# Patient Record
Sex: MALE | Race: WHITE | NOT HISPANIC OR LATINO | URBAN - METROPOLITAN AREA
[De-identification: names, ages, dates, MRNs, and addresses within clinical notes are randomized per-mention and may not be internally consistent; named-entity substitution may affect disease eponyms.]

---

## 2020-10-20 ENCOUNTER — INPATIENT (INPATIENT)
Facility: HOSPITAL | Age: 25
LOS: 6 days | Discharge: HOME | End: 2020-10-27
Attending: STUDENT IN AN ORGANIZED HEALTH CARE EDUCATION/TRAINING PROGRAM | Admitting: PSYCHIATRY & NEUROLOGY
Payer: COMMERCIAL

## 2020-10-20 VITALS
HEART RATE: 63 BPM | RESPIRATION RATE: 18 BRPM | SYSTOLIC BLOOD PRESSURE: 122 MMHG | DIASTOLIC BLOOD PRESSURE: 58 MMHG | TEMPERATURE: 98 F

## 2020-10-20 DIAGNOSIS — F32.9 MAJOR DEPRESSIVE DISORDER, SINGLE EPISODE, UNSPECIFIED: ICD-10-CM

## 2020-10-20 DIAGNOSIS — F48.9 NONPSYCHOTIC MENTAL DISORDER, UNSPECIFIED: ICD-10-CM

## 2020-10-20 LAB
ALBUMIN SERPL ELPH-MCNC: 4.9 G/DL — SIGNIFICANT CHANGE UP (ref 3.3–5)
ALP SERPL-CCNC: 55 U/L — SIGNIFICANT CHANGE UP (ref 40–120)
ALT FLD-CCNC: 11 U/L — SIGNIFICANT CHANGE UP (ref 4–41)
AMPHET UR-MCNC: NEGATIVE — SIGNIFICANT CHANGE UP
ANION GAP SERPL CALC-SCNC: 11 MMO/L — SIGNIFICANT CHANGE UP (ref 7–14)
APAP SERPL-MCNC: < 15 UG/ML — LOW (ref 15–25)
APPEARANCE UR: CLEAR — SIGNIFICANT CHANGE UP
AST SERPL-CCNC: 13 U/L — SIGNIFICANT CHANGE UP (ref 4–40)
B PERT DNA SPEC QL NAA+PROBE: SIGNIFICANT CHANGE UP
BACTERIA # UR AUTO: NEGATIVE — SIGNIFICANT CHANGE UP
BARBITURATES UR SCN-MCNC: NEGATIVE — SIGNIFICANT CHANGE UP
BASOPHILS # BLD AUTO: 0.06 K/UL — SIGNIFICANT CHANGE UP (ref 0–0.2)
BASOPHILS NFR BLD AUTO: 0.8 % — SIGNIFICANT CHANGE UP (ref 0–2)
BENZODIAZ UR-MCNC: NEGATIVE — SIGNIFICANT CHANGE UP
BILIRUB SERPL-MCNC: 0.3 MG/DL — SIGNIFICANT CHANGE UP (ref 0.2–1.2)
BILIRUB UR-MCNC: NEGATIVE — SIGNIFICANT CHANGE UP
BLOOD UR QL VISUAL: NEGATIVE — SIGNIFICANT CHANGE UP
BUN SERPL-MCNC: 26 MG/DL — HIGH (ref 7–23)
C PNEUM DNA SPEC QL NAA+PROBE: SIGNIFICANT CHANGE UP
CALCIUM SERPL-MCNC: 9.6 MG/DL — SIGNIFICANT CHANGE UP (ref 8.4–10.5)
CANNABINOIDS UR-MCNC: NEGATIVE — SIGNIFICANT CHANGE UP
CHLORIDE SERPL-SCNC: 103 MMOL/L — SIGNIFICANT CHANGE UP (ref 98–107)
CO2 SERPL-SCNC: 26 MMOL/L — SIGNIFICANT CHANGE UP (ref 22–31)
COCAINE METAB.OTHER UR-MCNC: NEGATIVE — SIGNIFICANT CHANGE UP
COLOR SPEC: YELLOW — SIGNIFICANT CHANGE UP
CREAT SERPL-MCNC: 1.17 MG/DL — SIGNIFICANT CHANGE UP (ref 0.5–1.3)
EOSINOPHIL # BLD AUTO: 0.04 K/UL — SIGNIFICANT CHANGE UP (ref 0–0.5)
EOSINOPHIL NFR BLD AUTO: 0.5 % — SIGNIFICANT CHANGE UP (ref 0–6)
ETHANOL BLD-MCNC: < 10 MG/DL — SIGNIFICANT CHANGE UP
FLUAV H1 2009 PAND RNA SPEC QL NAA+PROBE: SIGNIFICANT CHANGE UP
FLUAV H1 RNA SPEC QL NAA+PROBE: SIGNIFICANT CHANGE UP
FLUAV H3 RNA SPEC QL NAA+PROBE: SIGNIFICANT CHANGE UP
FLUAV SUBTYP SPEC NAA+PROBE: SIGNIFICANT CHANGE UP
FLUBV RNA SPEC QL NAA+PROBE: SIGNIFICANT CHANGE UP
GLUCOSE SERPL-MCNC: 90 MG/DL — SIGNIFICANT CHANGE UP (ref 70–99)
GLUCOSE UR-MCNC: NEGATIVE — SIGNIFICANT CHANGE UP
HADV DNA SPEC QL NAA+PROBE: SIGNIFICANT CHANGE UP
HCOV PNL SPEC NAA+PROBE: SIGNIFICANT CHANGE UP
HCT VFR BLD CALC: 46 % — SIGNIFICANT CHANGE UP (ref 39–50)
HGB BLD-MCNC: 15.2 G/DL — SIGNIFICANT CHANGE UP (ref 13–17)
HMPV RNA SPEC QL NAA+PROBE: SIGNIFICANT CHANGE UP
HPIV1 RNA SPEC QL NAA+PROBE: SIGNIFICANT CHANGE UP
HPIV2 RNA SPEC QL NAA+PROBE: SIGNIFICANT CHANGE UP
HPIV3 RNA SPEC QL NAA+PROBE: SIGNIFICANT CHANGE UP
HPIV4 RNA SPEC QL NAA+PROBE: SIGNIFICANT CHANGE UP
HYALINE CASTS # UR AUTO: NEGATIVE — SIGNIFICANT CHANGE UP
IMM GRANULOCYTES NFR BLD AUTO: 0.3 % — SIGNIFICANT CHANGE UP (ref 0–1.5)
KETONES UR-MCNC: NEGATIVE — SIGNIFICANT CHANGE UP
LEUKOCYTE ESTERASE UR-ACNC: NEGATIVE — SIGNIFICANT CHANGE UP
LYMPHOCYTES # BLD AUTO: 2.28 K/UL — SIGNIFICANT CHANGE UP (ref 1–3.3)
LYMPHOCYTES # BLD AUTO: 30.8 % — SIGNIFICANT CHANGE UP (ref 13–44)
MCHC RBC-ENTMCNC: 28 PG — SIGNIFICANT CHANGE UP (ref 27–34)
MCHC RBC-ENTMCNC: 33 % — SIGNIFICANT CHANGE UP (ref 32–36)
MCV RBC AUTO: 84.9 FL — SIGNIFICANT CHANGE UP (ref 80–100)
METHADONE UR-MCNC: NEGATIVE — SIGNIFICANT CHANGE UP
MONOCYTES # BLD AUTO: 0.42 K/UL — SIGNIFICANT CHANGE UP (ref 0–0.9)
MONOCYTES NFR BLD AUTO: 5.7 % — SIGNIFICANT CHANGE UP (ref 2–14)
NEUTROPHILS # BLD AUTO: 4.58 K/UL — SIGNIFICANT CHANGE UP (ref 1.8–7.4)
NEUTROPHILS NFR BLD AUTO: 61.9 % — SIGNIFICANT CHANGE UP (ref 43–77)
NITRITE UR-MCNC: NEGATIVE — SIGNIFICANT CHANGE UP
NRBC # FLD: 0 K/UL — SIGNIFICANT CHANGE UP (ref 0–0)
OPIATES UR-MCNC: NEGATIVE — SIGNIFICANT CHANGE UP
OXYCODONE UR-MCNC: NEGATIVE — SIGNIFICANT CHANGE UP
PCP UR-MCNC: NEGATIVE — SIGNIFICANT CHANGE UP
PH UR: 6 — SIGNIFICANT CHANGE UP (ref 5–8)
PLATELET # BLD AUTO: 199 K/UL — SIGNIFICANT CHANGE UP (ref 150–400)
PMV BLD: 11.5 FL — SIGNIFICANT CHANGE UP (ref 7–13)
POTASSIUM SERPL-MCNC: 4.4 MMOL/L — SIGNIFICANT CHANGE UP (ref 3.5–5.3)
POTASSIUM SERPL-SCNC: 4.4 MMOL/L — SIGNIFICANT CHANGE UP (ref 3.5–5.3)
PROT SERPL-MCNC: 7.5 G/DL — SIGNIFICANT CHANGE UP (ref 6–8.3)
PROT UR-MCNC: 50 — SIGNIFICANT CHANGE UP
RAPID RVP RESULT: SIGNIFICANT CHANGE UP
RBC # BLD: 5.42 M/UL — SIGNIFICANT CHANGE UP (ref 4.2–5.8)
RBC # FLD: 12.1 % — SIGNIFICANT CHANGE UP (ref 10.3–14.5)
RBC CASTS # UR COMP ASSIST: SIGNIFICANT CHANGE UP (ref 0–?)
RSV RNA SPEC QL NAA+PROBE: SIGNIFICANT CHANGE UP
RV+EV RNA SPEC QL NAA+PROBE: SIGNIFICANT CHANGE UP
SALICYLATES SERPL-MCNC: < 5 MG/DL — LOW (ref 15–30)
SARS-COV-2 RNA SPEC QL NAA+PROBE: SIGNIFICANT CHANGE UP
SODIUM SERPL-SCNC: 140 MMOL/L — SIGNIFICANT CHANGE UP (ref 135–145)
SP GR SPEC: 1.04 — SIGNIFICANT CHANGE UP (ref 1–1.04)
SQUAMOUS # UR AUTO: SIGNIFICANT CHANGE UP
TSH SERPL-MCNC: 1.29 UIU/ML — SIGNIFICANT CHANGE UP (ref 0.27–4.2)
UROBILINOGEN FLD QL: NORMAL — SIGNIFICANT CHANGE UP
WBC # BLD: 7.4 K/UL — SIGNIFICANT CHANGE UP (ref 3.8–10.5)
WBC # FLD AUTO: 7.4 K/UL — SIGNIFICANT CHANGE UP (ref 3.8–10.5)
WBC UR QL: SIGNIFICANT CHANGE UP (ref 0–?)

## 2020-10-20 PROCEDURE — 99285 EMERGENCY DEPT VISIT HI MDM: CPT

## 2020-10-20 NOTE — ED PROVIDER NOTE - CLINICAL SUMMARY MEDICAL DECISION MAKING FREE TEXT BOX
This is a 25 yr old M, with no psychiatric nor medical hx with c/o bizarre behaviour. As per triage RN, pt bib father ( [phone]) who told RN, today he took soon to a Crisis Center due he stopped talking approximately 2 wks ago.   Upon arrival to  pt calm and cooperative responding with head nodding, but did not choose to speak. He appears sad and depressed.  Labs, psych consult

## 2020-10-20 NOTE — ED BEHAVIORAL HEALTH ASSESSMENT NOTE - SUMMARY
Patient is a 25 year old single unemployed non-caregiver  male currently residing in a private residence with family. Recently unenrolled from [facility] Fall 2020. No PPH. No history of suicide attempts. No hx of aggression, violence, legal issues or substance abuse problems. No PMH. BIB father referred by therapist for depression.    Patient presents to the ED referred for depression. Patient is minimally cooperative with interview. He admits to depressive symptoms related to feelings of worthlessness. He endorses hopelessness, it not future motivated and passive suicidal ideation. Patient is unable to safety plan. He cannot name protective factors or reasons for living. He is unwilling to participate in outpatient treatment stating nothing will help him. Per father he has not been eating, isolative and not functioning well. He refuses to participate in safety plan endorsing passive suicidal ideation, hopelessness and depression. He has poor judgement/insight and presents at risk to self. He requires inpatient admission for safety and stabilization. Patient is a 25 year old single unemployed non-caregiver  male currently residing in a private residence with family. Recently unenrolled from [facility] Fall 2020. No PPH. No history of suicide attempts. No hx of aggression, violence, legal issues or substance abuse problems. No PMH. BIB father referred by therapist for depression.    Patient presents to the ED referred for depression. Patient admits to depressive symptoms related to feelings of worthlessness. He endorses hopelessness, it not future motivated and endorses passive suicidal ideation. Patient is unable to safety plan. He cannot name protective factors or reasons for living. He is unwilling to participate in outpatient treatment stating nothing will help him. Per father he has not been eating, isolative and not functioning well. He refuses to participate in safety plan endorsing passive suicidal ideation, hopelessness and depression. He has poor judgement/insight and presents at risk to self. He requires inpatient admission for safety and stabilization.

## 2020-10-20 NOTE — ED ADULT NURSE NOTE - OBJECTIVE STATEMENT
Pt brought in by family, a&ox3, calm and cooperative, selective mute, not responding to questions other then head nod, denies si/ah, substance abuse. Pt tearful, does not appear in distress. Respirations even/unlabored, nad noted. belongings collected and secured. psych to see. will continue to monitor

## 2020-10-20 NOTE — ED BEHAVIORAL HEALTH ASSESSMENT NOTE - HPI (INCLUDE ILLNESS QUALITY, SEVERITY, DURATION, TIMING, CONTEXT, MODIFYING FACTORS, ASSOCIATED SIGNS AND SYMPTOMS)
Patient is a 25 year old single unemployed non-caregiver  male currently residing in a private residence with family. Recently unenrolled from [facility] Fall 2020. No PPH. No history of suicide attempts. No hx of aggression, violence, legal issues or substance abuse problems. No PMH. BIB father referred by therapist for depression.    Patient reports he does not know why he is here. He was noted to have poor eye contact and appear dysphoric. He was tearful at times. He stated he doesn't know why he is here. He admits to not speaking to his family but when asked why he stated "I don't know." He nodded when asked if he was feeling sad. He stated he has been feeling this way x a few months. When asked about precipitants or triggers he said "everything." He refused to elaborate. He stated "it's too much, it's everything." Patient later admitted that one of these things is that no one wants to be around him. He was asked to elaborate or why he thought this and he stated "because I do." He admitted to passive SI stating he has been having thoughts he wishes he was dead/not alive and has been feeling this way x 1 week. He stated he is a failure and "there is no point." He was noted to have facial hair and asked if this is his normal appearance and he stated" there is no point anymore." Patient was questioned regarding active SI/plan/intent and he denied. He was asked what makes him want to keep living and he said "nothing." Patient was asked about interest in treatment and he stated nothing would help him. He stated "I can't be fixed." Patient is a 25 year old single unemployed non-caregiver  male currently residing in a private residence with family. Recently unenrolled from [facility] Fall 2020. No PPH. No history of suicide attempts. No hx of aggression, violence, legal issues or substance abuse problems. No PMH. BIB father referred by therapist for depression.    Patient reports he does not know why he is here. He was noted to have poor eye contact and appear dysphoric. He was tearful at times. He stated he doesn't know why he is here. He admits to not speaking to his family but when asked why he stated "I don't know." He nodded when asked if he was feeling sad. He stated he has been feeling this way x a few months. When asked about precipitants or triggers he said "everything." He refused to elaborate. He stated "it's too much, it's everything." Patient later admitted that one of these things is that no one wants to be around him. He was asked to elaborate or why he thought this and he stated "because I do." He admitted to passive SI stating he has been having thoughts he wishes he was dead/not alive and has been feeling this way x 1 week. He stated he is a failure and "there is no point." He was noted to have facial hair and asked if this is his normal appearance and he stated" there is no point anymore." Patient was questioned regarding active SI/plan/intent and he denied. He was asked what makes him want to keep living and he said "nothing." Patient was asked about interest in treatment and he stated nothing would help him. He stated "I can't be fixed." He was questioned about what he does during the day and he said "nothing." He reports he has nothing to enjoy.     Patient denies any hallucinations, does not report any delusional thought content, denies thought insertion/withdrawal, denies referential thought processes & is not paranoid on interview. Pt is linear,logical, organized and well related. Patient does not report nor exhibit any signs of ted, including irritable or elevated mood, grandiosity, pressured speech, risk-taking behaviors, increase in productivity or agitation. Patient denies HI, violent thoughts.     See  note for collateral information. Patient is a 25 year old single unemployed non-caregiver  male currently residing in a private residence with family. Recently unenrolled from [facility] Fall 2020. No PPH. No history of suicide attempts. No hx of aggression, violence, legal issues or substance abuse problems. No PMH. BIB father referred by therapist for depression.    Patient reports he does not know why he is here. He was noted to have poor eye contact and appear dysphoric. He was tearful at times. He stated he doesn't know why he is here. He admits to not speaking to his family but when asked why he stated "I don't know." He nodded when asked if he was feeling sad. He stated he has been feeling this way x a few months. When asked about precipitants or triggers he said "everything." He refused to elaborate. He stated "it's too much, it's everything." Patient later admitted that one of these things is that no one wants to be around him and he is "socially inept."  He was asked to elaborate or why he thought this and he stated "because I do." He stated at school and at home no one wants to hang out with him because of the "way I am." He reports his depression is a symptom of his social issues which cannot be fixed. He admitted to passive SI stating he has been having thoughts he wishes he was dead/not alive and has been feeling this way x 1 week. He stated he is a failure and "there is no point." He was noted to have facial hair and asked if this is his normal appearance and he stated" there is no point anymore." Patient was questioned regarding active SI/plan/intent and he denied. He was asked what makes him want to keep living and he said "nothing." Patient was asked about interest in treatment and he stated nothing would help him. He stated "I can't be fixed." He was questioned about what he does during the day and he said "nothing." He reports he has nothing to enjoy.     Patient denies any hallucinations, does not report any delusional thought content, denies thought insertion/withdrawal, denies referential thought processes & is not paranoid on interview. Pt is linear,logical, organized and well related. Patient does not report nor exhibit any signs of ted, including irritable or elevated mood, grandiosity, pressured speech, risk-taking behaviors, increase in productivity or agitation. Patient denies HI, violent thoughts.     See  note for collateral information. Patient is a 25 year old single unemployed non-caregiver  male currently residing in a private residence with family. Recently unenrolled from [facility] Fall 2020. No PPH. No history of suicide attempts. No hx of aggression, violence, legal issues or substance abuse problems. No PMH. BIB father referred by therapist for depression.    Patient reports he does not know why he is here. He was noted to have poor eye contact and appear dysphoric. He was tearful at times. He stated he doesn't know why he is here. He admits to not speaking to his family but when asked why he stated "I don't know." He nodded when asked if he was feeling sad. He stated he has been feeling this way x a few months. When asked about precipitants or triggers he said "everything." He refused to elaborate. He stated "it's too much, it's everything." Patient later admitted that one of these things is that no one wants to be around him and he is "socially inept."  He was asked to elaborate or why he thought this and he stated "because I do." He stated at school and at home no one wants to hang out with him because of the "way I am." He reports his depression is a symptom of his social issues which cannot be fixed. He admitted to passive SI stating he has been having thoughts he wishes he was dead/not alive and has been feeling this way x 1 week. He stated he is a failure and "there is no point." He was noted to have facial hair and asked if this is his normal appearance and he stated" there is no point anymore." Patient was questioned regarding active SI/plan/intent and he denied. He was asked what makes him want to keep living and he said "nothing." He refused to safety plan stating he has no one he could reach out to. Patient was asked about interest in treatment and he stated nothing would help him. He stated "I can't be fixed." He was questioned about what he does during the day and he said "nothing." He reports he has nothing to enjoy.     Patient denies any hallucinations, does not report any delusional thought content, denies thought insertion/withdrawal, denies referential thought processes & is not paranoid on interview. Pt is linear,logical, organized and well related. Patient does not report nor exhibit any signs of ted, including irritable or elevated mood, grandiosity, pressured speech, risk-taking behaviors, increase in productivity or agitation. Patient denies HI, violent thoughts.     See  note for collateral information.

## 2020-10-20 NOTE — ED BEHAVIORAL HEALTH ASSESSMENT NOTE - DETAILS
see hpi paternal grandfather father paternal grandfather committed suicide Dr. Griffith [address]

## 2020-10-20 NOTE — ED BEHAVIORAL HEALTH ASSESSMENT NOTE - RISK ASSESSMENT
risk factors- depression, suicidality, impairment in functioning, unable to safety plan, hopelessness, family history of suicide and not future oriented Moderate Acute Suicide Risk risk factors- depression, suicidality, impairment in functioning, unable to safety plan, hopelessness, family history of suicide, not eating and not future oriented

## 2020-10-20 NOTE — ED BEHAVIORAL HEALTH ASSESSMENT NOTE - DESCRIPTION
During course of ED visit patient was calm and cooperative. Patient was not aggressive or violent and did not require PRN medications.    Vital Signs Last 24 Hrs  T(C): 36.4 (20 Oct 2020 16:17), Max: 36.4 (20 Oct 2020 16:17)  T(F): 97.5 (20 Oct 2020 16:17), Max: 97.5 (20 Oct 2020 16:17)  HR: 63 (20 Oct 2020 16:17) (63 - 63)  BP: 122/58 (20 Oct 2020 16:17) (122/58 - 122/58)  BP(mean): --  RR: 18 (20 Oct 2020 16:17) (18 - 18)  SpO2: -- none see hpi

## 2020-10-20 NOTE — ED BEHAVIORAL HEALTH NOTE - BEHAVIORAL HEALTH NOTE
Writer outreached pt's father, Torito Carbajal, at [phone] to provide update. Pt's mother, Shreya, answered and was informed of pt's boarding status for hopefully bed assignment at [facility] in AM. Support offered with questions addressed.

## 2020-10-20 NOTE — ED PROVIDER NOTE - OBJECTIVE STATEMENT
This is a 25 yr old M, with no psychiatric nor medical hx with c/o bizarre behaviour. As per triage RN, pt bib father ( [phone]) who told RN, today he took soon to a Crisis Center due he stopped talking approximately 2 wks ago.   Upon arrival to  pt calm and cooperative responding with head nodding, but did not choose to speak. He appears sad and depressed.

## 2020-10-20 NOTE — ED ADULT TRIAGE NOTE - CHIEF COMPLAINT QUOTE
Pt with depression not speaking x 2 weeks to any adult per father . Pt was sent over from crisis  center.

## 2020-10-20 NOTE — ED BEHAVIORAL HEALTH NOTE - BEHAVIORAL HEALTH NOTE
COVID Exposure Screen- Patient    1.	*In the past 14 days, have you been around anyone with a positive COVID-19 test?*   (  ) Yes   ( x ) No   (  ) Unknown- Reason (e.g. patient uncertain, sedated, refusing to answer, etc.):  ______  IF YES PROCEED TO QUESTION #2. IF NO or UNKNOWN, PLEASE SKIP TO QUESTION #7  2.	Were you within 6 feet of them for at least 15 minutes? (  ) Yes   (  ) No   (  ) Unknown- Reason: ______    3.	Have you provided care for them? (  ) Yes   (  ) No   (  ) Unknown- Reason: ______    4.	Have you had direct physical contact with them (touched, hugged, or kissed them)? (  ) Yes   (  ) No    (  ) Unknown- Reason: ______    5.	Have you shared eating or drinking utensils with them? (  ) Yes   (  ) No    (  ) Unknown- Reason: ______    6.	Have they sneezed, coughed, or somehow got respiratory droplets on you? (  ) Yes   (  ) No    (  ) Unknown- Reason: ______    7.	*Have you been out of [place] within the past 14 days?*  (  ) Yes   ( x ) No   (  ) Unknown- Reason (e.g. patient uncertain, sedated, refusing to answer, etc.): _______  IF YES PLEASE ANSWER THE FOLLOWING QUESTIONS:  8.	Which state/country have you been to? ______   9.	Were you there over 24 hours? (  ) Yes   (  ) No    (  ) Unknown- Reason: ______    10.	What date did you return to [facility]? ______

## 2020-10-20 NOTE — ED BEHAVIORAL HEALTH NOTE - BEHAVIORAL HEALTH NOTE
Pt is a 25 year old male BIB father referred after therapy appointment today. Writer contacted pt’s father, Torito Solomon Vallejo, at [phone]. Pt’s father provided the following information:     Pt domiciled with parents and 3 siblings. Pt with no medical concerns. No formal mental health history. No past psychiatric hospitalizations. Father although reports one previous episode of depression at 20 years old while attending college at [employer]. Father reports pt withdrew, attended brief talk therapy 3-4 months, no medications, and did better after time. Pt then returned to college attending [employer] in 2019. Pt returned to school this past Aug. 2020 for senior year. Pt living off campus with 2 roommates. Father reports state in lockdown after school started. Pt did not adjust well to lockdown started to become depressed. Father visited pt third week in August not at his best and was going to give it one more week. Pt then first week in September withdrew from college and returned home. Pt has remained home since unemployed, not in school, and playing video games majority of his time. Father reports that pt since has withdrawn from family. Pt remaining in room and in last 2 weeks stopped communicating with family. Father explains a “sleep mode” to which pt is sleeping just won’t communicate despite attempts to engage. Father can however hear pt on video games at night talking, but pt will not speak to family. Pt earlier in August 2020 at start of decline was reported to have expressed negative thoughts about self in regards to having no one to spend time with and not having a girlfriend. Father reports no SI intent or plan ever expressed. Sister asked to which pt reported “absolutely not” and wouldn’t be able to harm self. Pt has no hx of attempts. No HI intent or plan reported. Pt has no hx of psychosis. No hx of violence reported. No hx of trauma. No known exposure to COVID in the last month. No concerns for symptoms. Father reports some weight loss with family now cooking meals/encouraging pt to eat more. Pt is showering. No concerns for sleep changes. No reported substance abuse. Pt at baseline said to be social, in shape, eating, and everything is fine.    Father took pt to therapist today. Concerns brought up were pt not communicating, appearing lethargic, not eating, in which pt was directed to ED for further evaluation Family hx of depression reported. Paternal grandfather passed from suicide. Father does not report any known acute safety concerns requiring hospitalizations but seeking help for pt.

## 2020-10-20 NOTE — ED BEHAVIORAL HEALTH ASSESSMENT NOTE - CASE SUMMARY
Patient is a 25 year old single unemployed non-caregiver  male currently residing in a private residence with family. Recently unenrolled from [employer] Fall 2020. No PPH. No history of suicide attempts. No hx of aggression, violence, legal issues or substance abuse problems. No PMH. BIB father referred by therapist for depression.    Patient presents to the ED referred for depression. Patient admits to depressive symptoms related to feelings of worthlessness. He endorses hopelessness, it not future motivated and endorses passive suicidal ideation. Patient is unable to safety plan. He cannot name protective factors or reasons for living. He is unwilling to participate in outpatient treatment stating nothing will help him. Per father he has not been eating, isolative and not functioning well. He refuses to participate in safety plan endorsing passive suicidal ideation, hopelessness and depression. He has poor judgement/insight and presents at risk to self. He requires inpatient admission for safety and stabilization.

## 2020-10-20 NOTE — ED BEHAVIORAL HEALTH ASSESSMENT NOTE - SUICIDE RISK FACTORS
Recent onset of current/past psychiatric diagnosis/Hopelessness or despair/Current mood episode/Anhedonia/Mood Disorder current/past

## 2020-10-21 DIAGNOSIS — F33.9 MAJOR DEPRESSIVE DISORDER, RECURRENT, UNSPECIFIED: ICD-10-CM

## 2020-10-21 LAB
SARS-COV-2 IGG SERPL QL IA: NEGATIVE — SIGNIFICANT CHANGE UP
SARS-COV-2 IGM SERPL IA-ACNC: 0.73 INDEX — SIGNIFICANT CHANGE UP

## 2020-10-21 PROCEDURE — 99223 1ST HOSP IP/OBS HIGH 75: CPT | Mod: GC

## 2020-10-21 NOTE — ED ADULT NURSE REASSESSMENT NOTE - NS ED NURSE REASSESS COMMENT FT1
Pt awake and alert refusing to speak or answer any questions, breathing even and unlabored, vs as noted. Pt awaiting bed assignment. Will monitor.

## 2020-10-21 NOTE — PROGRESS NOTE BEHAVIORAL HEALTH - SUMMARY
25/M with no established past psych hx, no prior in-Pt psych admissions, no hx of suicide attempts amd no hx of substance abuse.  Presented yesterday to the ED BIB father as a referral from a therapist due to worsening depression.    Yesterday, admitted to depressive symptoms with feelings of hopelessness and worthlessness.  Continues to be amotivated and unable to partake towards safety planning; had earlier harbored passive SI.  He is currently, psychomotorically retarded.  Has been withdrawn; not talking much.. does not engage.  Pt is not catatonic.  Not manic or floridly psychotic.  He cannot name protective factors or reasons for living. Pt is unwilling to participate in outpatient treatment stating nothing will help him.   Collateral information was obtained from the Pt's father who reported that Pt has been withdrawn, isolating self; has not been able to fully fend for self.  At this time, depressive symptoms have caused significant functional impairment.  He remains a threat to self and hence, cannot be safely discharged back to the community.  Pt will benefit from in-patient psych admission aimed at establishing some degree of mood stabilization as well as ensuring safety.

## 2020-10-21 NOTE — PROGRESS NOTE BEHAVIORAL HEALTH - NSBHCHARTREVIEWLAB_PSY_A_CORE FT
LABS:                        15.2   7.40  )-----------( 199      ( 20 Oct 2020 17:30 )             46.0     20 Oct 2020 17:30    140    |  103    |  26     ----------------------------<  90     4.4     |  26     |  1.17     Ca    9.6        20 Oct 2020 17:30    TPro  7.5    /  Alb  4.9    /  TBili  0.3    /  DBili  x      /  AST  13     /  ALT  11     /  AlkPhos  55     20 Oct 2020 17:30    Tox: negative  BAL < 10    Covid: negative

## 2020-10-21 NOTE — PROGRESS NOTE BEHAVIORAL HEALTH - NSBHCHARTREVIEWVS_PSY_A_CORE FT
Vital Signs Last 24 Hrs  T(C): 36.7 (20 Oct 2020 23:30), Max: 36.7 (20 Oct 2020 20:30)  T(F): 98.1 (20 Oct 2020 23:30), Max: 98.1 (20 Oct 2020 23:30)  HR: 58 (20 Oct 2020 23:30) (58 - 67)  BP: 100/58 (20 Oct 2020 23:30) (100/58 - 128/72)  BP(mean): --  RR: 16 (20 Oct 2020 23:30) (16 - 18)  SpO2: 100% (20 Oct 2020 23:30) (100% - 100%)

## 2020-10-21 NOTE — ED ADULT NURSE REASSESSMENT NOTE - NS ED NURSE REASSESS COMMENT FT1
Pt sleeping; respirations even and non labored. Pt continues to wait for inpatient psychiatric bed when available.

## 2020-10-21 NOTE — PROGRESS NOTE BEHAVIORAL HEALTH - NSBHFUPINTERVALHXFT_PSY_A_CORE
Since his  ED arrival, the Pt has been withdrawn.  There has been no agitation/aggressive behavior.  No current verbalization of active SI/HI.   There are no signs/symptoms of acute ted or florid psychosis.   Pt is not showing any signs/symptoms of intoxication or withdrawal. He is not delirious.  Pt has not tested limits.. Has maintained appropriate boundaries. Pt has been easily redirected.  Overall, there has been no management issues.  He did not get any PRN meds

## 2020-10-22 LAB
CHOLEST SERPL-MCNC: 173 MG/DL — SIGNIFICANT CHANGE UP (ref 120–199)
HBA1C BLD-MCNC: 5.3 % — SIGNIFICANT CHANGE UP (ref 4–5.6)
HDLC SERPL-MCNC: 39 MG/DL — SIGNIFICANT CHANGE UP (ref 35–55)
LIPID PNL WITH DIRECT LDL SERPL: 131 MG/DL — SIGNIFICANT CHANGE UP
TRIGL SERPL-MCNC: 93 MG/DL — SIGNIFICANT CHANGE UP (ref 10–149)

## 2020-10-22 PROCEDURE — 99232 SBSQ HOSP IP/OBS MODERATE 35: CPT

## 2020-10-23 PROCEDURE — 99231 SBSQ HOSP IP/OBS SF/LOW 25: CPT | Mod: GC

## 2020-10-24 PROCEDURE — 99232 SBSQ HOSP IP/OBS MODERATE 35: CPT

## 2020-10-25 PROCEDURE — 99232 SBSQ HOSP IP/OBS MODERATE 35: CPT

## 2020-10-26 PROCEDURE — 99231 SBSQ HOSP IP/OBS SF/LOW 25: CPT | Mod: GC

## 2020-10-27 VITALS — DIASTOLIC BLOOD PRESSURE: 65 MMHG | TEMPERATURE: 98 F | HEART RATE: 69 BPM | SYSTOLIC BLOOD PRESSURE: 111 MMHG

## 2020-10-27 PROBLEM — Z78.9 OTHER SPECIFIED HEALTH STATUS: Chronic | Status: ACTIVE | Noted: 2020-10-20

## 2020-10-27 PROCEDURE — 99239 HOSP IP/OBS DSCHRG MGMT >30: CPT | Mod: GC

## 2021-12-13 NOTE — ED BEHAVIORAL HEALTH ASSESSMENT NOTE - NS ED BHA DEMOGRAPHICS MEDICAL RECORD REVIEWED CONSENT OBTAINED YN
Problem: Adult Inpatient Plan of Care  Goal: Optimal Comfort and Wellbeing     Pt. Cont to use PCA to the limit, up to commode x1 for BM attempt and only able to urinate, cooperative this evening, will cont to monitor.    Yes
